# Patient Record
Sex: MALE | Race: WHITE | Employment: OTHER | URBAN - METROPOLITAN AREA
[De-identification: names, ages, dates, MRNs, and addresses within clinical notes are randomized per-mention and may not be internally consistent; named-entity substitution may affect disease eponyms.]

---

## 2017-07-27 ENCOUNTER — ALLSCRIPTS OFFICE VISIT (OUTPATIENT)
Dept: OTHER | Facility: OTHER | Age: 67
End: 2017-07-27

## 2017-07-27 DIAGNOSIS — N40.0 ENLARGED PROSTATE WITHOUT LOWER URINARY TRACT SYMPTOMS (LUTS): ICD-10-CM

## 2017-07-27 LAB
BILIRUB UR QL STRIP: ABNORMAL
CLARITY UR: ABNORMAL
COLOR UR: ABNORMAL
GLUCOSE (HISTORICAL): ABNORMAL
HGB UR QL STRIP.AUTO: ABNORMAL
KETONES UR STRIP-MCNC: ABNORMAL MG/DL
LEUKOCYTE ESTERASE UR QL STRIP: ABNORMAL
NITRITE UR QL STRIP: ABNORMAL
PH UR STRIP.AUTO: 5.5 [PH]
PROT UR STRIP-MCNC: 30 MG/DL
SP GR UR STRIP.AUTO: 1.02
UROBILINOGEN UR QL STRIP.AUTO: 0.2

## 2018-01-12 VITALS
HEIGHT: 70 IN | SYSTOLIC BLOOD PRESSURE: 110 MMHG | BODY MASS INDEX: 32.93 KG/M2 | WEIGHT: 230 LBS | DIASTOLIC BLOOD PRESSURE: 80 MMHG

## 2018-02-09 ENCOUNTER — TELEPHONE (OUTPATIENT)
Dept: UROLOGY | Facility: AMBULATORY SURGERY CENTER | Age: 68
End: 2018-02-09

## 2018-02-12 DIAGNOSIS — N52.8 OTHER MALE ERECTILE DYSFUNCTION: Primary | ICD-10-CM

## 2018-02-12 RX ORDER — SILDENAFIL 100 MG/1
100 TABLET, FILM COATED ORAL AS NEEDED
Qty: 10 TABLET | Refills: 0 | Status: SHIPPED | OUTPATIENT
Start: 2018-02-12 | End: 2018-06-21 | Stop reason: SDUPTHER

## 2018-02-12 NOTE — TELEPHONE ENCOUNTER
Patient submitted paperwork to extend his application to the [employer] Patient Assistance Program for Viagra 100mg  Paperwork completed, financial documentation and new script all faxed to [employer] at [phone]

## 2018-02-16 NOTE — TELEPHONE ENCOUNTER
Patient would like a call back checking on status of Viagra refill  His correct phone number is his cell phone [phone]  He does not have the landline anymore

## 2018-02-21 NOTE — TELEPHONE ENCOUNTER
Patient called to check status of application  Patient Assistance Program has not yet responded to his application  Approval still pending

## 2018-03-06 NOTE — TELEPHONE ENCOUNTER
Patient called to check status of application  Application should have already been processed  Representative states "script" wasn't complete on the application and a fax cover sheet was not attached to the original documents sent  Apparently the fax cover sheet is necessary in verifying that the script is indeed coming from a doctor's office and not just the patient  I also appears that it took them 4 weeks to determine without even notifying the office or the patient regarding missing information  All areas were completed on the application  A fax cover sheet and the application were AGAIN faxed to [phone]  They are up-to-date on all applications so processing time should only be 10-14 days  Spoke with patient regarding same

## 2018-03-21 ENCOUNTER — TELEPHONE (OUTPATIENT)
Dept: UROLOGY | Facility: MEDICAL CENTER | Age: 68
End: 2018-03-21

## 2018-03-21 NOTE — TELEPHONE ENCOUNTER
SUCCESS! Application APPROVED on 3/17/18  Order for Viagra 100mg tablets was placed on 3/19/18 and anticipated delivery is scheduled for 3/22/18  Left message on patient voice regarding same

## 2018-03-26 NOTE — TELEPHONE ENCOUNTER
Patient acknowledged my previous phone call  He is out of town and will likely  medication on Thursday, March 29, 2018

## 2018-06-21 DIAGNOSIS — N52.8 OTHER MALE ERECTILE DYSFUNCTION: ICD-10-CM

## 2018-06-21 NOTE — TELEPHONE ENCOUNTER
Pt called into refill bin for his viagra Rx through the [employer] plan  Please let me know when you signed

## 2018-06-22 RX ORDER — SILDENAFIL 100 MG/1
100 TABLET, FILM COATED ORAL AS NEEDED
Qty: 10 TABLET | Refills: 0 | Status: SHIPPED | OUTPATIENT
Start: 2018-06-22

## 2018-07-09 ENCOUNTER — TELEPHONE (OUTPATIENT)
Dept: UROLOGY | Facility: MEDICAL CENTER | Age: 68
End: 2018-07-09

## 2018-07-09 NOTE — TELEPHONE ENCOUNTER
Patient calling to check status of his Viagra order via the Patient Assistance Program   I left a message for a representative to return my call  I was unable to reorder so I think they are processing the order he called about while I was on vacation

## 2018-07-09 NOTE — TELEPHONE ENCOUNTER
Esthela Flores from [facility] Patient Assistance Program returned my call  Status inquiry did NOT turn up any orders in process  (Records indicate next order could have been processesed back on 6/19/18)  I reordered more medication with the representative at this time  Patient is welcome to call on Wednesday 7/11/18 to check status of order  Patient made aware of same

## 2018-07-25 NOTE — TELEPHONE ENCOUNTER
I called to check status of his order  Delivery was expected for last week  Automated system had no documentation of a request   I requested a Medication Counselor to call me back because the wait time on the [address] was 54 minutes!

## 2018-07-26 NOTE — TELEPHONE ENCOUNTER
Mil Hill from [address] [place] [address] called back  She stated the order placed on 7/9/18 got "stuck" in their system as they were in the process of a company-wide computer upgrade  She verified that ZGNDX#[id number] will be processed immediately; receipt is expected in our office in 2-3 days  Patient informed of same  Patient states he has a visit scheduled with Dr Cheryl Pantoja for next Wednesday 7/31/18 and will just retrieve the medication then

## 2018-07-27 RX ORDER — LISINOPRIL 10 MG/1
10 TABLET ORAL DAILY
Refills: 3 | COMMUNITY
Start: 2018-06-03 | End: 2019-08-15 | Stop reason: ALTCHOICE

## 2018-07-27 RX ORDER — FUROSEMIDE 40 MG/1
TABLET ORAL
Refills: 5 | COMMUNITY
Start: 2018-07-08 | End: 2019-08-15 | Stop reason: ALTCHOICE

## 2018-07-27 RX ORDER — ATORVASTATIN CALCIUM 40 MG/1
40 TABLET, FILM COATED ORAL DAILY
Refills: 0 | COMMUNITY
Start: 2018-07-17

## 2018-07-27 RX ORDER — POTASSIUM CHLORIDE 1500 MG/1
20 TABLET, EXTENDED RELEASE ORAL DAILY
Refills: 3 | COMMUNITY
Start: 2018-07-10

## 2018-07-27 RX ORDER — METOPROLOL TARTRATE 50 MG/1
TABLET, FILM COATED ORAL
Refills: 2 | COMMUNITY
Start: 2018-06-29

## 2018-07-27 RX ORDER — WARFARIN SODIUM 5 MG/1
1 TABLET ORAL DAILY
COMMUNITY

## 2018-07-31 ENCOUNTER — OFFICE VISIT (OUTPATIENT)
Dept: UROLOGY | Facility: MEDICAL CENTER | Age: 68
End: 2018-07-31
Payer: COMMERCIAL

## 2018-07-31 VITALS
SYSTOLIC BLOOD PRESSURE: 130 MMHG | DIASTOLIC BLOOD PRESSURE: 64 MMHG | WEIGHT: 241 LBS | BODY MASS INDEX: 34.5 KG/M2 | HEIGHT: 70 IN

## 2018-07-31 DIAGNOSIS — Z12.5 ENCOUNTER FOR PROSTATE CANCER SCREENING: ICD-10-CM

## 2018-07-31 DIAGNOSIS — N40.1 BPH WITH OBSTRUCTION/LOWER URINARY TRACT SYMPTOMS: Primary | ICD-10-CM

## 2018-07-31 DIAGNOSIS — N13.8 BPH WITH OBSTRUCTION/LOWER URINARY TRACT SYMPTOMS: Primary | ICD-10-CM

## 2018-07-31 LAB
SL AMB  POCT GLUCOSE, UA: NEGATIVE
SL AMB LEUKOCYTE ESTERASE,UA: ABNORMAL
SL AMB POCT BILIRUBIN,UA: NEGATIVE
SL AMB POCT BLOOD,UA: ABNORMAL
SL AMB POCT CLARITY,UA: CLEAR
SL AMB POCT COLOR,UA: YELLOW
SL AMB POCT KETONES,UA: NEGATIVE
SL AMB POCT NITRITE,UA: NEGATIVE
SL AMB POCT PH,UA: 6
SL AMB POCT SPECIFIC GRAVITY,UA: 1.02
SL AMB POCT URINE PROTEIN: NEGATIVE
SL AMB POCT UROBILINOGEN: 0.2

## 2018-07-31 PROCEDURE — 81003 URINALYSIS AUTO W/O SCOPE: CPT | Performed by: UROLOGY

## 2018-07-31 PROCEDURE — 99214 OFFICE O/P EST MOD 30 MIN: CPT | Performed by: UROLOGY

## 2018-07-31 RX ORDER — ASCORBIC ACID 500 MG
500 TABLET ORAL
COMMUNITY

## 2018-07-31 NOTE — LETTER
2018     Mary Jane Shaikh MD  [address] [address]    Patient: Reynaldo Delgado   YOB: 1950   Date of Visit: 2018       Dear Dr Yariel Echeverria: Thank you for referring Rubi Crowe to me for evaluation  Below are my notes for this consultation  If you have questions, please do not hesitate to call me  I look forward to following your patient along with you  Sincerely,        Pio Roche MD        CC: No Recipients  Pio Roche MD  2018  8:49 AM  Sign at close encounter  [address] [facility]  [address], [address] [facility]  [phone]  www  [facility]  org      NAME: Reynaldo Delgado  AGE: 78 y o  SEX: male  : 1950   MRN: [id number]    DATE: 2018  TIME: 8:36 AM    Assessment and Plan:  Erectile dysfunction:  Viagra still works for him, using 100 mg  Will continue this  Microscopic hematuria:  Has had negative workup for this in the past   Will check PSA and send results to him  Follow-up 1 year  Chief Complaint     Chief Complaint   Patient presents with    Benign Prostatic Hypertrophy     1 yr ck       History of Present Illness   Established patient here for f/u of BPH without symptoms and ED  Has cristal receiving Viagra from [employer] pt assistance program   Today's urinalysis shows small blood-has undergone workup for this in the past   No recent PSA  The following portions of the patient's history were reviewed and updated as appropriate: allergies, current medications, past family history, past medical history, past social history, past surgical history and problem list     Review of Systems   Review of Systems   Cardiovascular: Positive for leg swelling  Genitourinary: Negative  Active Problem List   There is no problem list on file for this patient        Objective   /64   Ht 5' 10" (1 778 m)   Wt 109 kg (241 lb)   BMI 34 58 kg/m²      Physical Exam   Constitutional: He is oriented to person, place, and time  He appears well-developed and well-nourished  HENT:   Head: Normocephalic and atraumatic  Eyes: EOM are normal    Neck: Normal range of motion  Pulmonary/Chest: Effort normal    Genitourinary: Rectum normal and prostate normal    Genitourinary Comments: Prostate 20 g smooth symmetric and benign  Normal rectal tone without masses  Musculoskeletal: Normal range of motion  Neurological: He is alert and oriented to person, place, and time  Skin: Skin is warm and dry  Psychiatric: He has a normal mood and affect   His behavior is normal  Judgment and thought content normal            Current Medications     Current Outpatient Prescriptions:     ascorbic acid (VITAMIN C) 500 MG tablet, Take 500 mg by mouth, Disp: , Rfl:     atorvastatin (LIPITOR) 40 mg tablet, Take 40 mg by mouth daily, Disp: , Rfl: 0    furosemide (LASIX) 40 mg tablet, TAKE 1 TABLET BY MOUTH DAILY MAY TAKE TWICE DAILY AS NEEDED, Disp: , Rfl: 5    KLOR-CON M20 20 MEQ tablet, Take 20 mEq by mouth daily, Disp: , Rfl: 3    lisinopril (ZESTRIL) 10 mg tablet, Take 10 mg by mouth daily, Disp: , Rfl: 3    metoprolol tartrate (LOPRESSOR) 50 mg tablet, TAKE 1 TABLETS BY ORAL ROUTE 2 TIMES EVERY DAY WITH MEALS, Disp: , Rfl: 2    sildenafil (VIAGRA) 100 mg tablet, Take 1 tablet (100 mg total) by mouth as needed for erectile dysfunction, Disp: 10 tablet, Rfl: 0    warfarin (COUMADIN) 5 mg tablet, Take 1 tablet by mouth daily, Disp: , Rfl:         Jonathon Pérez MD

## 2018-07-31 NOTE — PROGRESS NOTES
[address] [facility]  [facility]  [address] [facility]  [place], [address] [facility]  [phone]  www  [facility]  org      NAME: Celestine Evans  AGE: 78 y o  SEX: male  : 1950   MRN: [id number]    DATE: 2018  TIME: 8:36 AM    Assessment and Plan:  Erectile dysfunction:  Viagra still works for him, using 100 mg  Will continue this  Microscopic hematuria:  Has had negative workup for this in the past   Will check PSA and send results to him  Follow-up 1 year  Chief Complaint     Chief Complaint   Patient presents with    Benign Prostatic Hypertrophy     1 yr ck       History of Present Illness   Established patient here for f/u of BPH without symptoms and ED  Has cristal receiving Viagra from [employer] pt assistance program   Today's urinalysis shows small blood-has undergone workup for this in the past   No recent PSA  The following portions of the patient's history were reviewed and updated as appropriate: allergies, current medications, past family history, past medical history, past social history, past surgical history and problem list     Review of Systems   Review of Systems   Cardiovascular: Positive for leg swelling  Genitourinary: Negative  Active Problem List   There is no problem list on file for this patient  Objective   /64   Ht 5' 10" (1 778 m)   Wt 109 kg (241 lb)   BMI 34 58 kg/m²     Physical Exam   Constitutional: He is oriented to person, place, and time  He appears well-developed and well-nourished  HENT:   Head: Normocephalic and atraumatic  Eyes: EOM are normal    Neck: Normal range of motion  Pulmonary/Chest: Effort normal    Genitourinary: Rectum normal and prostate normal    Genitourinary Comments: Prostate 20 g smooth symmetric and benign  Normal rectal tone without masses  Musculoskeletal: Normal range of motion  Neurological: He is alert and oriented to person, place, and time  Skin: Skin is warm and dry  Psychiatric: He has a normal mood and affect   His behavior is normal  Judgment and thought content normal            Current Medications     Current Outpatient Prescriptions:     ascorbic acid (VITAMIN C) 500 MG tablet, Take 500 mg by mouth, Disp: , Rfl:     atorvastatin (LIPITOR) 40 mg tablet, Take 40 mg by mouth daily, Disp: , Rfl: 0    furosemide (LASIX) 40 mg tablet, TAKE 1 TABLET BY MOUTH DAILY MAY TAKE TWICE DAILY AS NEEDED, Disp: , Rfl: 5    KLOR-CON M20 20 MEQ tablet, Take 20 mEq by mouth daily, Disp: , Rfl: 3    lisinopril (ZESTRIL) 10 mg tablet, Take 10 mg by mouth daily, Disp: , Rfl: 3    metoprolol tartrate (LOPRESSOR) 50 mg tablet, TAKE 1 TABLETS BY ORAL ROUTE 2 TIMES EVERY DAY WITH MEALS, Disp: , Rfl: 2    sildenafil (VIAGRA) 100 mg tablet, Take 1 tablet (100 mg total) by mouth as needed for erectile dysfunction, Disp: 10 tablet, Rfl: 0    warfarin (COUMADIN) 5 mg tablet, Take 1 tablet by mouth daily, Disp: , Rfl:         Perico Sanches MD

## 2018-07-31 NOTE — TELEPHONE ENCOUNTER
Patient called to say he never heard from either Nate Alvarez or our office  He had an appointment earlier today and was hoping to  his medication today  I called [employer], again  I went through the order process again in the hopes of just overriding the problem  I then waited for a Medicine Access Counselor Madina Fang) to verify they at least have the new order on record  She did see my request from today but an order# won't be assigned to it until it ships  She recommended I call tomorrow (8/1/18) to check status  When I asked her about Order#: [id number] placed 7/9/18, she said it looks as if it shipped 7/16/18 but she also sees my call again from 7/25/18 about the delivery not being received  She apologized for the mix-up but really couldn't shed further light on the problem  Mr Sales Elizabet aware of my action today  I promised to call tomorrow regarding status

## 2018-08-01 NOTE — TELEPHONE ENCOUNTER
I called the [facility] to check status of yesterdays order  The automated system found nothing so I requested a Medicine Access Counselor  Waited 15+ minutes on the phone    Spoke with Cheryl Tran who verified another order was placed yesterday (Order#: [id number]) but no tracking# has been associated with this request   Therefore, the order has NOT shipped yet  Once a tracking number has been associated with the order, fulfillment is expected at our office within 7 to 10 days  Cheryl Tran also offered that [employer] is well aware of this SNAFU which was the result of a company-wide update of same sort  She was deeply apologetic for the difficulties and suggested calling back next Tues/Wed to check on a tracking number  Made patient aware of same

## 2018-08-06 ENCOUNTER — TELEPHONE (OUTPATIENT)
Dept: UROLOGY | Facility: MEDICAL CENTER | Age: 68
End: 2018-08-06

## 2018-08-06 NOTE — TELEPHONE ENCOUNTER
Spoke with pt and told him Viagra tablets arrived from [employer] Patient Assistance Program  He will get them tomorrow at reception area of [address]

## 2018-08-09 NOTE — TELEPHONE ENCOUNTER
[facility] regarding status  Spoke with Corky Lafleur who verified Tracking#: [id number]  Package was delivered on 8/6/18 and Aury Damon signed for it  I went to [address] to inquire about status  Per Mirtha Puentes RN, package was received on 8/6/18; patient picked up the medication on 8/7/18

## 2018-11-14 ENCOUNTER — TELEPHONE (OUTPATIENT)
Dept: UROLOGY | Facility: MEDICAL CENTER | Age: 68
End: 2018-11-14

## 2018-11-14 NOTE — TELEPHONE ENCOUNTER
Patient called to request a refill on his Viagra 100mg from [employer] Patient [facility]   Request was placed and delivery anticipated within 7-10 days (11/21/18 thru 11/26/18)  Patient aware of same

## 2018-11-20 NOTE — TELEPHONE ENCOUNTER
Checking status of his order  The automated system had nothing so I spoke with a Medicine Access Counselor  She verified an order is in process (Order#:  [id number]) but has not yet shipped  Processing is still scheduled for 7-10 days from 11/14/18  Patient aware of same  He's away for the Thanksgiving holiday and won't be home to pick-up his delivery until 11/27/18 anyway  Nursing Department in [address] [place] [address] was made aware of pending delivery

## 2019-03-15 DIAGNOSIS — N52.8 OTHER MALE ERECTILE DYSFUNCTION: Primary | ICD-10-CM

## 2019-03-21 RX ORDER — SILDENAFIL 100 MG/1
TABLET, FILM COATED ORAL
Qty: 30 TABLET | Refills: 3 | Status: SHIPPED | OUTPATIENT
Start: 2019-03-21 | End: 2019-08-15 | Stop reason: SDUPTHER

## 2019-03-21 NOTE — TELEPHONE ENCOUNTER
Patient called requesting refill on Viagra through [facility] Patient Assistance Program   When I called, they didn't have his renewal paperwork so it needs to be submitted again  In the meantime, the patient is interested in getting a #30 count supply for $45 at [facility]  This should help offset when he experiences system delays with [employer]    Script for Sildenafil 100mg - Sig: Take 1 tablet PO 1 hour prior to intercourse #30 with 3 refills was queued and forwarded to ADEEL Cruz for approval

## 2019-08-15 ENCOUNTER — OFFICE VISIT (OUTPATIENT)
Dept: UROLOGY | Facility: MEDICAL CENTER | Age: 69
End: 2019-08-15
Payer: COMMERCIAL

## 2019-08-15 VITALS
BODY MASS INDEX: 31.21 KG/M2 | WEIGHT: 218 LBS | SYSTOLIC BLOOD PRESSURE: 138 MMHG | DIASTOLIC BLOOD PRESSURE: 88 MMHG | HEART RATE: 55 BPM | HEIGHT: 70 IN

## 2019-08-15 DIAGNOSIS — N52.1 ERECTILE DYSFUNCTION DUE TO DISEASES CLASSIFIED ELSEWHERE: ICD-10-CM

## 2019-08-15 DIAGNOSIS — N13.8 BPH WITH OBSTRUCTION/LOWER URINARY TRACT SYMPTOMS: Primary | ICD-10-CM

## 2019-08-15 DIAGNOSIS — N40.1 BPH WITH OBSTRUCTION/LOWER URINARY TRACT SYMPTOMS: Primary | ICD-10-CM

## 2019-08-15 LAB
SL AMB  POCT GLUCOSE, UA: ABNORMAL
SL AMB LEUKOCYTE ESTERASE,UA: ABNORMAL
SL AMB POCT BILIRUBIN,UA: ABNORMAL
SL AMB POCT BLOOD,UA: ABNORMAL
SL AMB POCT CLARITY,UA: CLEAR
SL AMB POCT COLOR,UA: YELLOW
SL AMB POCT KETONES,UA: ABNORMAL
SL AMB POCT NITRITE,UA: ABNORMAL
SL AMB POCT PH,UA: 5.5
SL AMB POCT SPECIFIC GRAVITY,UA: 1.02
SL AMB POCT URINE PROTEIN: ABNORMAL
SL AMB POCT UROBILINOGEN: 0.2

## 2019-08-15 PROCEDURE — 81003 URINALYSIS AUTO W/O SCOPE: CPT | Performed by: UROLOGY

## 2019-08-15 PROCEDURE — 99214 OFFICE O/P EST MOD 30 MIN: CPT | Performed by: UROLOGY

## 2019-08-15 RX ORDER — TADALAFIL 20 MG/1
20 TABLET ORAL
Qty: 3 TABLET | Refills: 11 | Status: SHIPPED | OUTPATIENT
Start: 2019-08-15 | End: 2019-12-11 | Stop reason: SDUPTHER

## 2019-08-15 NOTE — PROGRESS NOTES
[address] [facility]  [facility]  [address] [facility], [address] [place] [address]  [phone]  www  [facility]  org      NAME: Boby Perez  AGE: 80 y o  SEX: male  : 1950   MRN: [id number]    DATE: 8/15/2019  TIME: 8:37 AM    Assessment and Plan:    ED:  The blue pill of the Viagra works the best   [employer] is not coming through with their financial assistance  I recommended that he try Cialis 20 mg at [facility]  This was sent in  Encounter for prostate cancer screening:  Negative TED, which have PSA checked at Dr Lawyer Harrington office  Follow-up 1 year  He wishes to have his prostate cancer screening done here  Microscopic hematuria:  No blood on today's dipstick, negative workup in the past                Chief Complaint   No chief complaint on file  History of Present Illness   Follow-up Erectile dysfunction, microscopic hematuria which he has had a negative workup for in the past, and encounter for prostate cancer screening, :  Last PSA was 2018  Nothing more recent  This was stable  With regards to the Viagra, the blue pills worked better than the white pills  There is a major difference in effect  A PSA has been ordered  No voiding complaints  The following portions of the patient's history were reviewed and updated as appropriate: allergies, current medications, past family history, past medical history, past social history, past surgical history and problem list   Past Medical History:   Diagnosis Date    BPH with obstruction/lower urinary tract symptoms     CVA (cerebral vascular accident) ([facility] [address] ) 2017    Erectile dysfunction     Family hx of prostate cancer     Hematuria, microscopic     Hernia, inguinal      Past Surgical History:   Procedure Laterality Date    HERNIA REPAIR       shoulder  Review of Systems   Review of Systems   Gastrointestinal: Negative  Genitourinary: Negative  Active Problem List   There is no problem list on file for this patient  Objective   /88   Pulse 55   Ht 5' 10" (1 778 m)   Wt 98 9 kg (218 lb)   BMI 31 28 kg/m²     Physical Exam   Constitutional: He is oriented to person, place, and time  He appears well-developed and well-nourished  HENT:   Head: Normocephalic and atraumatic  Eyes: EOM are normal    Neck: Normal range of motion  Cardiovascular: Normal rate  Pulmonary/Chest: Effort normal    Genitourinary: Rectum normal and prostate normal    Genitourinary Comments: Rectal exam reveals normal tone, no masses and his prostate is about 20 g, smooth symmetric and benign  No nodules  Musculoskeletal: Normal range of motion  Neurological: He is alert and oriented to person, place, and time  Skin: Skin is warm and dry  Psychiatric: He has a normal mood and affect   His behavior is normal  Judgment and thought content normal            Current Medications     Current Outpatient Medications:     ascorbic acid (VITAMIN C) 500 MG tablet, Take 500 mg by mouth, Disp: , Rfl:     atorvastatin (LIPITOR) 40 mg tablet, Take 40 mg by mouth daily, Disp: , Rfl: 0    KLOR-CON M20 20 MEQ tablet, Take 20 mEq by mouth daily, Disp: , Rfl: 3    metoprolol tartrate (LOPRESSOR) 50 mg tablet, TAKE 1 TABLETS BY ORAL ROUTE 2 TIMES EVERY DAY WITH MEALS, Disp: , Rfl: 2    sildenafil (VIAGRA) 100 mg tablet, Take 1 tablet (100 mg total) by mouth as needed for erectile dysfunction, Disp: 10 tablet, Rfl: 0    warfarin (COUMADIN) 5 mg tablet, Take 1 tablet by mouth daily, Disp: , Rfl:         Hafsa Cueva MD

## 2019-08-15 NOTE — PATIENT INSTRUCTIONS
Take the Cialis every 72 hours as needed  Make sure your last dose was at least 2 hours prior to having sex

## 2019-12-11 DIAGNOSIS — N52.1 ERECTILE DYSFUNCTION DUE TO DISEASES CLASSIFIED ELSEWHERE: ICD-10-CM

## 2019-12-11 RX ORDER — TADALAFIL 20 MG/1
TABLET ORAL
Qty: 30 TABLET | Refills: 2 | Status: SHIPPED | OUTPATIENT
Start: 2019-12-11 | End: 2020-10-28 | Stop reason: SDUPTHER

## 2019-12-11 NOTE — TELEPHONE ENCOUNTER
Patient left a message on the Medication Refill voice mail line requesting a new prescription for Tadalafil 20mg  Patient has concerns about cost-effective pricing and dosing instruction questions and would like to discuss  I spoke with the patient and it was determined that he can get the same drug using the [facility] pharmacy discount card at [address], #30 tablets for $36 40  The patient has an upcoming office visit scheduled for 8/17/20 with Dr Edilia Montes in the [facility] location but will run out of medication until then    Request for same, 30 tablets with 2 refills was queued and forwarded to the Advanced Practitioner covering the [facility] location for approval

## 2020-10-28 DIAGNOSIS — N52.1 ERECTILE DYSFUNCTION DUE TO DISEASES CLASSIFIED ELSEWHERE: ICD-10-CM

## 2020-10-28 RX ORDER — TADALAFIL 20 MG/1
TABLET ORAL
Qty: 30 TABLET | Refills: 2 | Status: SHIPPED | OUTPATIENT
Start: 2020-10-28 | End: 2021-07-07 | Stop reason: DRUGHIGH

## 2020-11-23 ENCOUNTER — OFFICE VISIT (OUTPATIENT)
Dept: UROLOGY | Facility: MEDICAL CENTER | Age: 70
End: 2020-11-23
Payer: COMMERCIAL

## 2020-11-23 VITALS
BODY MASS INDEX: 34.93 KG/M2 | HEIGHT: 70 IN | SYSTOLIC BLOOD PRESSURE: 130 MMHG | DIASTOLIC BLOOD PRESSURE: 84 MMHG | WEIGHT: 244 LBS

## 2020-11-23 DIAGNOSIS — Z12.5 PROSTATE CANCER SCREENING: Primary | ICD-10-CM

## 2020-11-23 PROCEDURE — 99214 OFFICE O/P EST MOD 30 MIN: CPT | Performed by: UROLOGY

## 2020-11-23 RX ORDER — FUROSEMIDE 40 MG/1
TABLET ORAL
COMMUNITY
Start: 2020-11-02

## 2020-12-18 ENCOUNTER — TELEPHONE (OUTPATIENT)
Dept: UROLOGY | Facility: MEDICAL CENTER | Age: 70
End: 2020-12-18

## 2021-07-07 DIAGNOSIS — N52.1 ERECTILE DYSFUNCTION DUE TO DISEASES CLASSIFIED ELSEWHERE: ICD-10-CM

## 2021-07-07 RX ORDER — TADALAFIL 5 MG/1
5 TABLET ORAL DAILY
Qty: 90 TABLET | Refills: 3 | Status: SHIPPED | OUTPATIENT
Start: 2021-07-07 | End: 2022-07-13 | Stop reason: SDUPTHER

## 2021-07-07 NOTE — TELEPHONE ENCOUNTER
Patient left a VM stating he has been trying to get a hold of you  He did not leave any info about why he wants to talk to you  I just tried to call him back, and I got his VM  He can be reached at [phone]

## 2021-07-07 NOTE — TELEPHONE ENCOUNTER
7/6/21 @ 8:17am - Patient left a message on the Medication Refill voice mail line stating he is currently using Tadalafil 20mg twice weekly with mixed and inconsistent response  His friend, however, has been using Tadalafil 5mg DAILY with great results  Message forwarded to the Advanced Practitioner covering in the [facility] location for further direction and/or advise

## 2022-07-13 DIAGNOSIS — N52.1 ERECTILE DYSFUNCTION DUE TO DISEASES CLASSIFIED ELSEWHERE: ICD-10-CM

## 2022-07-13 RX ORDER — TADALAFIL 5 MG/1
5 TABLET ORAL DAILY
Qty: 90 TABLET | Refills: 1 | Status: SHIPPED | OUTPATIENT
Start: 2022-07-13

## 2022-07-13 NOTE — TELEPHONE ENCOUNTER
Patient called me directly to request a refill on Tadalafil 5mg, 90 day supply with refills to [facility]  The patient has an upcoming office visit scheduled for 11/29/22 with Dr Yuan Guillermo in the [facility] location but will run out of medication until then    Request for same, 90 day supply with 1 refill was queued and forwarded to the Advanced Practitioner covering the [facility] for approval

## 2022-07-15 ENCOUNTER — TELEPHONE (OUTPATIENT)
Dept: UROLOGY | Facility: MEDICAL CENTER | Age: 72
End: 2022-07-15

## 2022-07-15 NOTE — TELEPHONE ENCOUNTER
Pt called and would like PSA script mailed to [address] [address] [address] , [address] [facility]

## 2022-07-15 NOTE — TELEPHONE ENCOUNTER
Returned call to patient   Patient called MR requesting lab order for psa   Active order found in system   No answer   Left voice mail requesting patient to return call to our office to clarify

## 2022-11-21 ENCOUNTER — TELEPHONE (OUTPATIENT)
Dept: UROLOGY | Facility: MEDICAL CENTER | Age: 72
End: 2022-11-21

## 2022-11-21 NOTE — TELEPHONE ENCOUNTER
Patient had appt with Dr David Weeks for 11/29/22 which had to be rescheduled  Left message with patient re need to hetal his appt  Gave him options of either here in [facility] but not until March or either at [facility] where he would be able to get in sooner  Patient lives in [facility]  Left message for pt to call back to hetal his 2 yr appt

## 2023-01-05 DIAGNOSIS — N52.1 ERECTILE DYSFUNCTION DUE TO DISEASES CLASSIFIED ELSEWHERE: ICD-10-CM

## 2023-01-05 RX ORDER — TADALAFIL 5 MG/1
5 TABLET ORAL DAILY
Qty: 90 TABLET | Refills: 3 | Status: SHIPPED | OUTPATIENT
Start: 2023-01-05

## 2023-01-05 NOTE — TELEPHONE ENCOUNTER
Patient called me directly to request a refill on Tadalafil 5mg, 90 day supply to [facility] in [place]  He will be using [facility] for best pricing  The patient has an upcoming office visit scheduled for 3/20/23 with Dr Wero Frank in the [facility] location but will run out of medication until then  Request for same, 90 day supply with NO refills was queued and forwarded to the Advanced Practitioner covering the [facility] location for approval     Patient made aware of same

## 2023-03-02 ENCOUNTER — TELEPHONE (OUTPATIENT)
Dept: UROLOGY | Facility: MEDICAL CENTER | Age: 73
End: 2023-03-02

## 2023-03-02 NOTE — TELEPHONE ENCOUNTER
Left message for pt to call office and hetal his appt with Dr Victor M Molina for [facility] office  L/M that he could go to either [facility] or [facility] if he wants to see Dr Victor M Molina

## 2024-01-22 DIAGNOSIS — N52.1 ERECTILE DYSFUNCTION DUE TO DISEASES CLASSIFIED ELSEWHERE: ICD-10-CM

## 2024-01-22 RX ORDER — TADALAFIL 5 MG/1
5 TABLET ORAL DAILY
Qty: 90 TABLET | Refills: 0 | Status: SHIPPED | OUTPATIENT
Start: 2024-01-22

## 2024-04-17 DIAGNOSIS — N52.1 ERECTILE DYSFUNCTION DUE TO DISEASES CLASSIFIED ELSEWHERE: ICD-10-CM

## 2024-04-17 LAB — PSA SERPL-MCNC: 1.01 NG/ML

## 2024-04-17 RX ORDER — TADALAFIL 5 MG/1
5 TABLET ORAL DAILY
Qty: 90 TABLET | Refills: 1 | Status: SHIPPED | OUTPATIENT
Start: 2024-04-17

## 2024-04-18 ENCOUNTER — TELEPHONE (OUTPATIENT)
Dept: UROLOGY | Facility: CLINIC | Age: 74
End: 2024-04-18

## 2024-04-18 NOTE — TELEPHONE ENCOUNTER
----- Message from Shan Saunders MD sent at 4/18/2024  1:13 PM EDT -----  Please let him know that his PSA is good at 1.01.

## 2024-04-22 ENCOUNTER — OFFICE VISIT (OUTPATIENT)
Dept: UROLOGY | Facility: CLINIC | Age: 74
End: 2024-04-22
Payer: COMMERCIAL

## 2024-04-22 VITALS
HEIGHT: 70 IN | DIASTOLIC BLOOD PRESSURE: 80 MMHG | WEIGHT: 229.8 LBS | RESPIRATION RATE: 16 BRPM | TEMPERATURE: 97.7 F | HEART RATE: 69 BPM | OXYGEN SATURATION: 96 % | BODY MASS INDEX: 32.9 KG/M2 | SYSTOLIC BLOOD PRESSURE: 130 MMHG

## 2024-04-22 DIAGNOSIS — Z12.5 PROSTATE CANCER SCREENING: Primary | ICD-10-CM

## 2024-04-22 DIAGNOSIS — N52.1 ERECTILE DYSFUNCTION DUE TO DISEASES CLASSIFIED ELSEWHERE: ICD-10-CM

## 2024-04-22 PROCEDURE — 99213 OFFICE O/P EST LOW 20 MIN: CPT

## 2024-04-22 NOTE — PROGRESS NOTES
"4/22/2024    Chief Complaint   Patient presents with    Follow-up       Assessment and Plan    73 y.o. male manage by Dr. Saunders    Prostate cancer screening  PSA 1.01 (4/17/2024)  Rectal exam deferred to next year  Follow-up 1 year.     Erectile dysfunction  Continue Cialis 5 mg daily    Subjective:    History of Present Illness  Deshawn Graves is a 73 y.o. male here for annual follow-up evaluation of prostate cancer screening and erectile dysfunction.    Established patient but new to me last seen by Dr. Saunders in April 2023.  Prior history of microscopic hematuria with negative workup.  Also with history of erectile dysfunction on Cialis 5 mg daily.    Prostate cancer screening: Current PSA 1.01 as of 4/17/2024 previously 0.94 on 1/24/2024 and 1.07 on 1/12/2023.            Review of Systems   Constitutional:  Negative for chills and fever.   HENT:  Negative for congestion and sore throat.    Respiratory:  Negative for cough and shortness of breath.    Cardiovascular:  Negative for chest pain and leg swelling.   Gastrointestinal:  Negative for abdominal pain, constipation and diarrhea.   Genitourinary:  Negative for difficulty urinating, dysuria, frequency, hematuria and urgency.   Musculoskeletal:  Negative for back pain and gait problem.   Skin:  Negative for wound.   Allergic/Immunologic: Negative for immunocompromised state.   Hematological:  Does not bruise/bleed easily.               Vitals  Vitals:    04/22/24 0933   BP: 130/80   BP Location: Right arm   Patient Position: Sitting   Cuff Size: Adult   Pulse: 69   Resp: 16   Temp: 97.7 °F (36.5 °C)   TempSrc: Oral   SpO2: 96%   Weight: 104 kg (229 lb 12.8 oz)   Height: 5' 10\" (1.778 m)       Physical Exam  Vitals reviewed.   Constitutional:       General: He is not in acute distress.     Appearance: Normal appearance. He is not ill-appearing or toxic-appearing.   HENT:      Head: Normocephalic and atraumatic.   Eyes:      General: No scleral icterus.     " Conjunctiva/sclera: Conjunctivae normal.   Cardiovascular:      Rate and Rhythm: Normal rate.   Pulmonary:      Effort: Pulmonary effort is normal. No respiratory distress.   Abdominal:      Tenderness: There is no right CVA tenderness or left CVA tenderness.      Hernia: No hernia is present.   Musculoskeletal:      Cervical back: Normal range of motion.      Right lower leg: No edema.      Left lower leg: No edema.   Skin:     General: Skin is warm and dry.      Coloration: Skin is not jaundiced or pale.   Neurological:      General: No focal deficit present.      Mental Status: He is alert and oriented to person, place, and time. Mental status is at baseline.      Gait: Gait normal.   Psychiatric:         Mood and Affect: Mood normal.         Behavior: Behavior normal.         Thought Content: Thought content normal.         Judgment: Judgment normal.         Past History  Past Medical History:   Diagnosis Date    BPH with obstruction/lower urinary tract symptoms     CVA (cerebral vascular accident) ([facility]) 01/2017    Erectile dysfunction     Family hx of prostate cancer     Hematuria, microscopic     Hernia, inguinal 2005     Social History     Socioeconomic History    Marital status: Single     Spouse name: None    Number of children: None    Years of education: None    Highest education level: None   Occupational History    None   Tobacco Use    Smoking status: Never     Passive exposure: Past    Smokeless tobacco: Never   Vaping Use    Vaping status: Never Used   Substance and Sexual Activity    Alcohol use: Yes     Comment: case a beer a week    Drug use: No    Sexual activity: Yes   Other Topics Concern    None   Social History Narrative    None     Social Determinants of Health     Financial Resource Strain: Not on file   Food Insecurity: Not on file   Transportation Needs: Not on file   Physical Activity: Not on file   Stress: Not on file   Social Connections: Not on file   Intimate Partner Violence: Not on  "file   Housing Stability: Not on file     Social History     Tobacco Use   Smoking Status Never    Passive exposure: Past   Smokeless Tobacco Never     Family History   Problem Relation Age of Onset    Nephrolithiasis Father     Kidney cancer Mother     Heart disease Mother     No Known Problems Paternal Grandmother     No Known Problems Maternal Grandmother     No Known Problems Maternal Grandfather     Prostate cancer Brother     Heart disease Brother     No Known Problems Daughter     No Known Problems Daughter        The following portions of the patient's history were reviewed and updated as appropriate allergies, current medications, past medical history, past social history, past surgical history and problem list    Imaging:    Results  No results found for this or any previous visit (from the past 1 hour(s)).]  Lab Results   Component Value Date    PSA 1.01 04/17/2024     Lab Results   Component Value Date    CALCIUM 9.4 01/24/2024    K 4.3 01/24/2024    CO2 27 01/24/2024     01/24/2024    BUN 19 01/24/2024    CREATININE 0.86 01/24/2024     No results found for: \"WBC\", \"HGB\", \"HCT\", \"MCV\", \"PLT\"    Please Note:  Voice dictation software has been used to create this document. There may be inadvertent transcriptions errors.     ADEEL Bhatia 04/22/24   "

## 2024-09-03 ENCOUNTER — TELEPHONE (OUTPATIENT)
Age: 74
End: 2024-09-03

## 2024-09-03 DIAGNOSIS — N52.1 ERECTILE DYSFUNCTION DUE TO DISEASES CLASSIFIED ELSEWHERE: Primary | ICD-10-CM

## 2024-09-03 NOTE — TELEPHONE ENCOUNTER
He can either continue with Cialis 5 mg daily and try adding sildenafil (Viagra) 20 mg as needed or we can switch him to on-demand therapy with either tadalafil (Cialis) or sildenafil (Viagra) and he would stop daily therapy.

## 2024-09-03 NOTE — TELEPHONE ENCOUNTER
Patient called in stating that he is currently taking 5 mg of Cialis daily and he does get an erection but loses it during intercourse. He is concerned the medication is no longer effective. Please advise.

## 2024-09-04 RX ORDER — SILDENAFIL CITRATE 20 MG/1
20 TABLET ORAL AS NEEDED
Qty: 30 TABLET | Refills: 3 | Status: SHIPPED | OUTPATIENT
Start: 2024-09-04

## 2024-09-04 NOTE — TELEPHONE ENCOUNTER
Contacted Deshawn and reviewed medications and instructions for use per ADEEL Sousa. Patient verbalized understanding.

## 2024-09-04 NOTE — TELEPHONE ENCOUNTER
Called and left a voicemail message for patient to contact the office to discuss his Cialis medication. When patient returns call, please relay the following per ADEEL Sousa:   He can either continue with Cialis 5 mg daily and try adding sildenafil (Viagra) 20 mg as needed or we can switch him to on-demand therapy with either tadalafil (Cialis) or sildenafil (Viagra) and he would stop daily therapy.     Will need to forward message to provider after patient makes a decision regarding medication so it can be ordered.

## 2024-09-04 NOTE — TELEPHONE ENCOUNTER
Pt returned call and the AP message was reviewed in detail. Pt stated he would like to try taking Viagra 20mg as needed along with his daily 5mg dose of Cialis. Pt confirmed [facility] in [place]. Pt questioning the timing of when to take the Viagra, an hour before needed or in the morning the day of?    Call back: [phone]

## 2024-09-04 NOTE — TELEPHONE ENCOUNTER
I sent a prescription for sildenafil to patients pharmacy. He should take Cialis 5 mg daily in the morning. On days that he desires to be sexually active, he will need to take sildenafil 20 mg 45 minutes prior to sexual activity.  He will need to take this on an empty stomach.  Do not take in close timing of Cialis, if necessary he can skip his daily dose of Cialis.

## 2024-10-25 DIAGNOSIS — N52.1 ERECTILE DYSFUNCTION DUE TO DISEASES CLASSIFIED ELSEWHERE: ICD-10-CM

## 2024-10-25 RX ORDER — TADALAFIL 5 MG/1
5 TABLET ORAL DAILY
Qty: 90 TABLET | Refills: 1 | Status: SHIPPED | OUTPATIENT
Start: 2024-10-25

## 2025-03-27 ENCOUNTER — TELEPHONE (OUTPATIENT)
Dept: UROLOGY | Facility: MEDICAL CENTER | Age: 75
End: 2025-03-27

## 2025-03-27 DIAGNOSIS — N52.9 ERECTILE DYSFUNCTION, UNSPECIFIED ERECTILE DYSFUNCTION TYPE: ICD-10-CM

## 2025-03-27 DIAGNOSIS — N52.9 ERECTILE DYSFUNCTION, UNSPECIFIED ERECTILE DYSFUNCTION TYPE: Primary | ICD-10-CM

## 2025-03-27 RX ORDER — SILDENAFIL 50 MG/1
100 TABLET, FILM COATED ORAL DAILY PRN
Qty: 10 TABLET | Refills: 2 | Status: SHIPPED | OUTPATIENT
Start: 2025-03-27

## 2025-03-27 RX ORDER — SILDENAFIL 50 MG/1
100 TABLET, FILM COATED ORAL DAILY PRN
Qty: 10 TABLET | Refills: 2 | Status: SHIPPED | OUTPATIENT
Start: 2025-03-27 | End: 2025-03-27 | Stop reason: SDUPTHER

## 2025-03-27 NOTE — TELEPHONE ENCOUNTER
Patient called stating that he is on viagra 20mg and a 5 mg and the medication is not working. Patient states that he would like to go back to taking the Viagra 100mg and would like that to be sent to the [facility]. Please review

## 2025-03-27 NOTE — TELEPHONE ENCOUNTER
Patient called the refill line stating that he needs to get his yearly visit scheduled for April. Informed him that I would send clerical a message to reach out and get that scheduled.

## 2025-03-27 NOTE — TELEPHONE ENCOUNTER
Called patient - LMOM with appointment with Dr. Saunders on 06/09/25 @ 11 in the [place] office.  He is asked to call back if his appointment does not work for him.

## 2025-03-27 NOTE — TELEPHONE ENCOUNTER
Call placed to pt. He did not answer. LMOM in detail informing him of the CRNP recommendations and instructions.   Office number was provided for call back if needed.

## 2025-03-27 NOTE — TELEPHONE ENCOUNTER
Can trial Viagra 100 mg. DC other ED medications then.  Please review that needs to be taken on empty stomach for full effectiveness.  1 hour prior to any sexual activity.  Reviewed side effects additionally.  Prescription sent.

## 2025-06-08 NOTE — PROGRESS NOTES
"UROLOGY PROGRESS NOTE   [facility]  [address]  [address]  [place] [address]  [phone]  Fax:[phone]  [email or web address]      NAME: Deshawn Graves  AGE: 74 y.o. SEX: male  : 1950   MRN: [id number]    DATE: 2025  TIME: 11:07 AM    Assessment and Plan:    ED- take 5 mg daily during week, then take up to 100 mg silenafil daily during the weekend.    Prostate cancer screening- check PSA and send results.    Microhematuria- no gross hematuria , doing well.        1. Prostate cancer screening  2. Erectile dysfunction, unspecified erectile dysfunction type  3. Microhematuria                  Chief Complaint     Chief Complaint   Patient presents with    Follow-up       History of Present Illness   Last seen by me personally 2023- last seen by Mr Manjarrez 2024.    Prostate cancer screening: No recent PSA.    Erectile dysfunction: Has been on Cialis 5 mg daily, 25 on the weekend. Takes Sildenafil 100 mg on the weekend sometimes but hasn't been working as well- loses it care home through.    Microhematuria status post negative work up in the past.      The following portions of the patient's history were reviewed and updated as appropriate: allergies, current medications, past family history, past medical history, past social history, past surgical history and problem list.  Past Medical History[1]  Past Surgical History[2]  shoulder  Review of Systems   Review of Systems   Genitourinary: Negative.        Active Problem List   Problem List[3]    Objective   /82 (BP Location: Left arm, Patient Position: Sitting, Cuff Size: Adult)   Pulse 72   Temp 97.7 °F (36.5 °C) (Temporal)   Resp 16   Ht 5' 10\" (1.778 m)   Wt 111 kg (245 lb 3.2 oz)   SpO2 95%   BMI 35.18 kg/m²     Physical Exam  Vitals reviewed.   Constitutional:       Appearance: Normal appearance.   HENT:      Head: Normocephalic and atraumatic.     Eyes:      Extraocular Movements: Extraocular " movements intact.     Pulmonary:      Effort: Pulmonary effort is normal.     Musculoskeletal:         General: Normal range of motion.      Cervical back: Normal range of motion.     Skin:     Coloration: Skin is not jaundiced or pale.     Neurological:      General: No focal deficit present.      Mental Status: He is alert and oriented to person, place, and time. Mental status is at baseline.     Psychiatric:         Mood and Affect: Mood normal.         Behavior: Behavior normal.         Thought Content: Thought content normal.         Judgment: Judgment normal.             Current Medications   Current Medications[4]        Shan Saunders MD                [1]   Past Medical History:  Diagnosis Date    BPH with obstruction/lower urinary tract symptoms     CVA (cerebral vascular accident) ([facility]) 01/2017    Erectile dysfunction     Family hx of prostate cancer     Hematuria, microscopic     Hernia, inguinal 2005   [2]   Past Surgical History:  Procedure Laterality Date    HERNIA REPAIR  2005   [3] There is no problem list on file for this patient.   [4]   Current Outpatient Medications:     ascorbic acid (VITAMIN C) 500 MG tablet, Take 500 mg by mouth, Disp: , Rfl:     atorvastatin (LIPITOR) 40 mg tablet, Take 40 mg by mouth in the morning., Disp: , Rfl: 0    Eliquis 5 MG, Take 5 mg by mouth in the morning and 5 mg in the evening., Disp: , Rfl:     furosemide (LASIX) 40 mg tablet, , Disp: , Rfl:     KLOR-CON M20 20 MEQ tablet, Take 20 mEq by mouth in the morning., Disp: , Rfl: 3    metoprolol tartrate (LOPRESSOR) 25 mg tablet, Take 75 mg by mouth in the morning and 75 mg in the evening., Disp: , Rfl:     sildenafil (VIAGRA) 50 MG tablet, Take 2 tablets (100 mg total) by mouth daily as needed for erectile dysfunction, Disp: 10 tablet, Rfl: 2    tadalafil (CIALIS) 5 MG tablet, TAKE ONE TABLET BY MOUTH EVERY DAY, Disp: 90 tablet, Rfl: 1

## 2025-06-09 ENCOUNTER — OFFICE VISIT (OUTPATIENT)
Dept: UROLOGY | Facility: CLINIC | Age: 75
End: 2025-06-09
Payer: COMMERCIAL

## 2025-06-09 VITALS
WEIGHT: 245.2 LBS | BODY MASS INDEX: 35.1 KG/M2 | HEART RATE: 72 BPM | SYSTOLIC BLOOD PRESSURE: 120 MMHG | OXYGEN SATURATION: 95 % | TEMPERATURE: 97.7 F | RESPIRATION RATE: 16 BRPM | HEIGHT: 70 IN | DIASTOLIC BLOOD PRESSURE: 82 MMHG

## 2025-06-09 DIAGNOSIS — Z12.5 PROSTATE CANCER SCREENING: Primary | ICD-10-CM

## 2025-06-09 DIAGNOSIS — N52.9 ERECTILE DYSFUNCTION, UNSPECIFIED ERECTILE DYSFUNCTION TYPE: ICD-10-CM

## 2025-06-09 DIAGNOSIS — R31.29 MICROHEMATURIA: ICD-10-CM

## 2025-06-09 DIAGNOSIS — N52.1 ERECTILE DYSFUNCTION DUE TO DISEASES CLASSIFIED ELSEWHERE: ICD-10-CM

## 2025-06-09 PROCEDURE — 99213 OFFICE O/P EST LOW 20 MIN: CPT | Performed by: UROLOGY

## 2025-06-09 RX ORDER — SILDENAFIL 100 MG/1
100 TABLET, FILM COATED ORAL DAILY PRN
Qty: 25 TABLET | Refills: 10 | Status: SHIPPED | OUTPATIENT
Start: 2025-06-09

## 2025-06-09 RX ORDER — TADALAFIL 5 MG/1
5 TABLET ORAL DAILY
Qty: 90 TABLET | Refills: 3 | Status: SHIPPED | OUTPATIENT
Start: 2025-06-09

## 2025-07-10 LAB — PSA SERPL-MCNC: 0.54 NG/ML
